# Patient Record
Sex: FEMALE | Race: WHITE | NOT HISPANIC OR LATINO | URBAN - METROPOLITAN AREA
[De-identification: names, ages, dates, MRNs, and addresses within clinical notes are randomized per-mention and may not be internally consistent; named-entity substitution may affect disease eponyms.]

---

## 2017-09-26 ENCOUNTER — EMERGENCY (EMERGENCY)
Facility: HOSPITAL | Age: 82
LOS: 1 days | Discharge: HOME | End: 2017-09-26
Attending: EMERGENCY MEDICINE | Admitting: EMERGENCY MEDICINE
Payer: MEDICARE

## 2017-09-26 VITALS
TEMPERATURE: 98 F | HEART RATE: 98 BPM | DIASTOLIC BLOOD PRESSURE: 89 MMHG | SYSTOLIC BLOOD PRESSURE: 139 MMHG | RESPIRATION RATE: 20 BRPM | OXYGEN SATURATION: 96 %

## 2017-09-26 VITALS
SYSTOLIC BLOOD PRESSURE: 120 MMHG | HEART RATE: 96 BPM | OXYGEN SATURATION: 97 % | TEMPERATURE: 98 F | RESPIRATION RATE: 18 BRPM | DIASTOLIC BLOOD PRESSURE: 84 MMHG

## 2017-09-26 PROCEDURE — 99284 EMERGENCY DEPT VISIT MOD MDM: CPT | Mod: 25

## 2017-09-26 PROCEDURE — 99284 EMERGENCY DEPT VISIT MOD MDM: CPT

## 2017-09-26 PROCEDURE — 70450 CT HEAD/BRAIN W/O DYE: CPT

## 2017-09-26 PROCEDURE — 70450 CT HEAD/BRAIN W/O DYE: CPT | Mod: 26

## 2017-09-26 RX ORDER — RAMIPRIL 5 MG
0 CAPSULE ORAL
Qty: 0 | Refills: 0 | COMMUNITY

## 2017-09-26 RX ORDER — FOLIC ACID 0.8 MG
0 TABLET ORAL
Qty: 0 | Refills: 0 | COMMUNITY

## 2017-09-26 RX ORDER — CARVEDILOL PHOSPHATE 80 MG/1
0 CAPSULE, EXTENDED RELEASE ORAL
Qty: 0 | Refills: 0 | COMMUNITY

## 2017-09-26 RX ORDER — ASPIRIN/CALCIUM CARB/MAGNESIUM 324 MG
0 TABLET ORAL
Qty: 0 | Refills: 0 | COMMUNITY

## 2017-09-26 RX ORDER — WARFARIN SODIUM 2.5 MG/1
0 TABLET ORAL
Qty: 0 | Refills: 0 | COMMUNITY

## 2017-09-26 RX ORDER — OMEPRAZOLE 10 MG/1
0 CAPSULE, DELAYED RELEASE ORAL
Qty: 0 | Refills: 0 | COMMUNITY

## 2017-09-26 NOTE — ED PROVIDER NOTE - OBJECTIVE STATEMENT
84 yo female, history of a.fib on coumadin, mechanical slip/fall, hit her occiput, no LOC, intermittent mild non-radiating pain now.  Denies other injury.  INR check today -- 1.5.

## 2017-09-26 NOTE — ED PROVIDER NOTE - PHYSICAL EXAMINATION
GEN: calm, cooperative, ENT: mucous membranes moist, HEAD: small abrasion to occiput, no bleeding, CV: S1 S2, RESP: no respiratory distress, ABD: no abdominal TTP, MSK: moves all extremities, NEURO: awake, alert, oriented, PSYCH: affect normal

## 2017-09-26 NOTE — ED ADULT NURSE NOTE - OBJECTIVE STATEMENT
82 yo female with PMH A-fib and on coumadin presents to the ED from home c/o fall tonight at 1830. patient states she was on her way to a meeting and tripped up the curb, fell forwards onto her left side and hit the left parietal lobe. patient has small lump to the left parietal lobe, no lacerations, bruising or edema present. patient c/o 2/10 pain upon palpitation. patient is AAOx4. speech is clear. upper and lower extremities equal in strength with positive sensation. patient lung sounds clear bilaterally. cap refill <3sec. patient denies chest pain, SOB, fever, chills, dizziness, HA, blurry or double vision, cough. all skin is intact. no other injuries noted. NELDA HECTOR notified.

## 2017-09-26 NOTE — ED PROVIDER NOTE - NS ED ROS FT
CONSTITUTIONAL: no fevers  HEENT: no loss of vision  CV: no chest pain  RESP: no SOB  GI: no nausea/vomiting  : no dysuria  DERM: no rash  MSK: no back pain  NEURO: no LOC  ENDO: no diabetes

## 2019-04-22 ENCOUNTER — OUTPATIENT (OUTPATIENT)
Dept: OUTPATIENT SERVICES | Facility: HOSPITAL | Age: 84
LOS: 1 days | End: 2019-04-22
Payer: MEDICARE

## 2019-04-22 ENCOUNTER — APPOINTMENT (OUTPATIENT)
Dept: RADIOLOGY | Facility: CLINIC | Age: 84
End: 2019-04-22
Payer: MEDICARE

## 2019-04-22 DIAGNOSIS — R05 COUGH: ICD-10-CM

## 2019-04-22 PROCEDURE — 71046 X-RAY EXAM CHEST 2 VIEWS: CPT | Mod: 26

## 2019-04-22 PROCEDURE — 71046 X-RAY EXAM CHEST 2 VIEWS: CPT

## 2021-01-11 ENCOUNTER — FORM ENCOUNTER (OUTPATIENT)
Age: 86
End: 2021-01-11

## 2021-01-12 ENCOUNTER — APPOINTMENT (OUTPATIENT)
Dept: DISASTER EMERGENCY | Facility: HOSPITAL | Age: 86
End: 2021-01-12

## 2021-01-12 ENCOUNTER — TRANSCRIPTION ENCOUNTER (OUTPATIENT)
Age: 86
End: 2021-01-12

## 2021-01-12 ENCOUNTER — OUTPATIENT (OUTPATIENT)
Dept: OUTPATIENT SERVICES | Facility: HOSPITAL | Age: 86
LOS: 1 days | End: 2021-01-12
Payer: MEDICARE

## 2021-01-12 VITALS
HEART RATE: 64 BPM | TEMPERATURE: 97 F | DIASTOLIC BLOOD PRESSURE: 67 MMHG | SYSTOLIC BLOOD PRESSURE: 152 MMHG | RESPIRATION RATE: 20 BRPM | OXYGEN SATURATION: 96 %

## 2021-01-12 VITALS
RESPIRATION RATE: 18 BRPM | SYSTOLIC BLOOD PRESSURE: 137 MMHG | OXYGEN SATURATION: 97 % | TEMPERATURE: 98 F | DIASTOLIC BLOOD PRESSURE: 81 MMHG | HEART RATE: 67 BPM

## 2021-01-12 DIAGNOSIS — U07.1 COVID-19: ICD-10-CM

## 2021-01-12 PROCEDURE — M0243: CPT

## 2021-01-12 NOTE — MONOCLONAL ANTIBODY INFUSION - ASSESSMENT AND PLAN
Patient is a 86y old  Female who presents with a chief complaint of slight HA, body ache found to have Covid 19 positive on 1/7/21. Here for infusion of monoclonal antibody.     I have reviewed the Casirivimab/Imdevimab Emergency Use Authorization (EUA) and I have provided the patient or patient's caregiver with the following information:      1. FDA has authorized emergency use Casirivimab/Imdevimab, which is not an FDA-approved biological product.  2. The patient or patient's caregiver has the option to accept or refuse administration of Casirivimab/Imdevimab.   3. The significant known and potential risks and benefits of Casirivimab/Imdevimab and the extent to which such risks and benefits are unknown.  4. Information on available alternative treatments and risks and benefits of those alternatives.    Patient is a 86y old  Female who presents with a chief complaint of slight HA, body ache found to have Covid 19 positive on 1/7/21. Here for infusion of monoclonal antibody.     I have reviewed the Casirivimab/Imdevimab Emergency Use Authorization (EUA) and I have provided the patient or patient's caregiver with the following information:      1. FDA has authorized emergency use Casirivimab/Imdevimab, which is not an FDA-approved biological product.  2. The patient or patient's caregiver has the option to accept or refuse administration of Casirivimab/Imdevimab.   3. The significant known and potential risks and benefits of Casirivimab/Imdevimab and the extent to which such risks and benefits are unknown.  4. Information on available alternative treatments and risks and benefits of those alternatives.     - Pt is stable for discharge  - Discharge instructions reviewed with pt, all questions answered.

## 2021-01-12 NOTE — MONOCLONAL ANTIBODY INFUSION - EXAM
PAST MEDICAL/SURGICAL/FAMILY/SOCIAL HISTORY:  Tobacco Usage:  - Tobacco Usage Unknown if ever smoked    ALLERGIES AND HOME MEDICATIONS:  Allergies:   Allergies:  	sulfa drugs: Drug Category, Short breath  	penicillin: Drug, Fever    Home Medications:  * Incomplete Medication History as of 26-Sep-2017 20:02 documented in  Structured Notes  - Coumadin: Last Dose Taken:  - Altace: Last Dose Taken:  - carvedilol: Last Dose Taken:  - Aspir 81: Last Dose Taken:  - folic acid:   - omeprazole:  Patient is a 86y old  Female who presents with a chief complaint of slight HA, body ache found to have Covid 19 positive on 1/7/21. Here for infusion of monoclonal antibody.     PAST MEDICAL/SURGICAL/FAMILY/SOCIAL HISTORY:  Tobacco Usage:  - Tobacco Usage Unknown if ever smoked    ALLERGIES AND HOME MEDICATIONS:   Allergies:  	sulfa drugs: Drug Category, Short breath  	penicillin: Drug, Fever    Home Medications:  - Coumadin:   - Altace:  - carvedilol:   - Aspir 81:   - folic acid:   - omeprazole:       PHYSICAL EXAM:  Vital Signs Last 24 Hrs  T(C): 37.1 (12 Jan 2021 15:35), Max: 37.1 (12 Jan 2021 15:35)  T(F): 98.7 (12 Jan 2021 15:35), Max: 98.7 (12 Jan 2021 15:35)  HR: 67 (12 Jan 2021 15:35) (61 - 83)  BP: 126/80 (12 Jan 2021 15:35) (124/59 - 152/67)  BP(mean): --  RR: 18 (12 Jan 2021 15:35) (18 - 20)  SpO2: 94% (12 Jan 2021 15:35) (94% - 97%)  CONSTITUTIONAL: NAD, well-developed, well-groomed  ENMT: Moist oral mucosa, no pharyngeal injection or exudates; normal dentition  RESPIRATORY: Normal respiratory effort; lungs are clear to auscultation bilaterally  CARDIOVASCULAR: Regular rate and rhythm, normal S1 and S2, no murmur/rub/gallop; No lower extremity edema; Peripheral pulses are 2+ bilaterally  ABDOMEN: Nontender to palpation, normoactive bowel sounds, no rebound/guarding; No hepatosplenomegaly  PSYCH: A+O to person, place, and time; affect appropriate  NEUROLOGY: CN 2-12 are intact and symmetric; no gross sensory deficits   SKIN: No rashes; no palpable lesions    LABS: Covid 19 positive on 1/7/21

## 2021-01-13 ENCOUNTER — TRANSCRIPTION ENCOUNTER (OUTPATIENT)
Age: 86
End: 2021-01-13

## 2021-01-18 ENCOUNTER — TRANSCRIPTION ENCOUNTER (OUTPATIENT)
Age: 86
End: 2021-01-18

## 2022-07-28 ENCOUNTER — APPOINTMENT (OUTPATIENT)
Dept: ORTHOPEDIC SURGERY | Facility: CLINIC | Age: 87
End: 2022-07-28

## 2022-07-28 DIAGNOSIS — R26.81 UNSTEADINESS ON FEET: ICD-10-CM

## 2022-07-28 DIAGNOSIS — R60.0 LOCALIZED EDEMA: ICD-10-CM

## 2022-07-28 DIAGNOSIS — I99.8 OTHER DISORDER OF CIRCULATORY SYSTEM: ICD-10-CM

## 2022-07-28 DIAGNOSIS — M21.6X2 OTHER ACQUIRED DEFORMITIES OF LEFT FOOT: ICD-10-CM

## 2022-07-28 DIAGNOSIS — M25.572 PAIN IN LEFT ANKLE AND JOINTS OF LEFT FOOT: ICD-10-CM

## 2022-07-28 PROCEDURE — 73610 X-RAY EXAM OF ANKLE: CPT | Mod: LT

## 2022-07-28 PROCEDURE — 99203 OFFICE O/P NEW LOW 30 MIN: CPT

## 2022-07-29 PROBLEM — I99.8 VASCULAR CALCIFICATION: Status: ACTIVE | Noted: 2022-07-29

## 2022-07-30 PROBLEM — M21.6X2 GASTROCNEMIUS EQUINUS OF LEFT LOWER EXTREMITY: Status: ACTIVE | Noted: 2022-07-30

## 2022-07-30 PROBLEM — R60.0 PEDAL EDEMA: Status: ACTIVE | Noted: 2022-07-30

## 2023-03-27 NOTE — ED ADULT NURSE NOTE - CAS TRG GEN SKIN COLOR
Normal for race Include Z78.9 (Other Specified Conditions Influencing Health Status) As An Associated Diagnosis?: No